# Patient Record
Sex: MALE | Race: WHITE
[De-identification: names, ages, dates, MRNs, and addresses within clinical notes are randomized per-mention and may not be internally consistent; named-entity substitution may affect disease eponyms.]

---

## 2018-03-28 ENCOUNTER — HOSPITAL ENCOUNTER (EMERGENCY)
Dept: HOSPITAL 89 - ER | Age: 23
Discharge: HOME | End: 2018-03-28
Payer: MEDICAID

## 2018-03-28 VITALS — DIASTOLIC BLOOD PRESSURE: 87 MMHG | SYSTOLIC BLOOD PRESSURE: 147 MMHG

## 2018-03-28 DIAGNOSIS — R07.1: Primary | ICD-10-CM

## 2018-03-28 DIAGNOSIS — I45.10: ICD-10-CM

## 2018-03-28 PROCEDURE — 93005 ELECTROCARDIOGRAM TRACING: CPT

## 2018-03-28 PROCEDURE — 71046 X-RAY EXAM CHEST 2 VIEWS: CPT

## 2018-03-28 PROCEDURE — 99283 EMERGENCY DEPT VISIT LOW MDM: CPT

## 2018-03-28 NOTE — ER REPORT
History and Physical


Time Seen By MD:  20:59


Hx. of Stated Complaint:  


PT REPORTS THAT HE DEVELOPED CHEST PAIN ABOUT ONE HOUR AGO.  PT REPORTS BEING 


SOB BUT STATES THAT THE SOB HAS RESOLVED.


HPI/ROS


CHIEF COMPLAINT: Cough, headache, chest pain





HISTORY OF PRESENT ILLNESS: 22-year-old male presents ambulatory to the ER with 

2 days of symptoms.  He's been having a sore throat and a cough.  Tonight.  

Patient notes some chest pain which concerned him.  He also notes a dull 

headache.  He denies photophobia or stiff neck.  Patient denies exposure to ill 

contacts.  Patient notes some nausea but no vomiting





REVIEW OF SYSTEMS:


Respiratory: As above


Cardiovascular: As above


Gastrointestinal: No vomiting, no abdominal pain.


Musculoskeletal: No back pain.


Allergies:  


Uncoded Allergies:  


     ANTS (Adverse Reaction, Intermediate, SWELLING, 16)


Home Meds


Discontinued Reported Medications


Acetaminophen (TYLENOL EXTRA STRENGTH) 500 Mg Tablet, 500 MG PO, TAB


   16


Reviewed Nurses Notes:  Yes


Old Medical Records Reviewed:  Yes


Hx Smoking:  No


Hx Substance Use Disorder:  No


Constitutional





Vital Sign - Last 24 Hours








 3/28/18 3/28/18 3/28/18 3/28/18





 20:44 20:58 21:00 21:15


 


Temp 99.0   


 


Pulse 109  108 98


 


Resp 14  20 15


 


B/P (MAP) 117/77 143/89 (107)  


 


Pulse Ox 95  95 95


 


O2 Delivery Room Air   


 


    





 3/28/18 3/28/18  





 21:28 21:30  


 


Pulse  86  


 


Resp  17  


 


B/P (MAP) 147/87 (107)   


 


Pulse Ox  94  








Physical Exam


  General Appearance: The patient is alert, has no immediate need for airway 

protection and no current signs of toxicity.  Vital signs stable, afebrile, 

pulse ox normal


HEENT: Pupils equal and round no injection.  TMs normal, oropharynx with mild 

redness, no exudate


Respiratory: Chest is non tender, lungs are clear to auscultation.  No wheezing 

or rails, positive.  Chest wall tenderness


Cardiac: regular rate and rhythm


Gastrointestinal: Abdomen is soft and non tender, no masses, bowel sounds 

normal.


Musculoskeletal:  Neck: Neck is supple and non tender.  No meningismus, no 

lymphadenopathy


Extremities have full range of motion and are non tender.


Skin: No rashes or lesions.





DIFFERENTIAL DIAGNOSIS: After history and physical exam differential diagnosis 

was considered for chest pain including but not limited to myocardial ischemia, 

pericarditis pulmonary embolus, chest wall pain, pleural inflammation and 

pulmonary infectious causes.





Medical Decision Making


EKG/Imaging


EKG Interpretation


12 lead EK


      Rhythm: normal sinus rhythm


      Axis: normal 


      QRS: normal, incomplete right bundle branch block pattern


      ST segments: normal, no evidence of ischemia or dysrhythmia


Imaging


X-ray: Two-view chest x-ray was obtained.  I viewed the images myself on the 

PACS system.  My interpretation of the images is: No infiltrate, no effusion, 

normal mediastinum.  The radiologist interpretation had no clinically 

significant variation from this interpretation.





ED Course/Re-evaluation


ED Course


Patient was admitted to an examination room.  H&P was done.  The differential 

diagnoses was considered.  On clinical examination.  Patient has chest wall 

tenderness.  His been having infectious symptoms.  He likely has viral 

syndrome.  He's had no high fever to suggest influenza.  Patient's throat has 

no exudate.  Patient's treated with Motrin, tramadol and Phenergan.  A chest x-

ray is unremarkable for infiltrate or pneumonia.  Patient's advised to 

conservative treatment plan.


Decision to Disposition Date:  Mar 28, 2018


Decision to Disposition Time:  21:40





Depart


Departure


Latest Vital Signs





Vital Signs








  Date Time  Temp Pulse Resp B/P (MAP) Pulse Ox O2 Delivery O2 Flow Rate FiO2


 


3/28/18 21:30  86 17  94   


 


3/28/18 21:28    147/87 (107)    


 


3/28/18 20:44 99.0     Room Air  








Impression:  


 Primary Impression:  


 Viral syndrome


 Additional Impression:  


 Chest pain


Condition:  Improved


Disposition:  HOME OR SELF-CARE


New Scripts


No Active Prescriptions or Reported Meds


Patient Instructions:  Chest Wall Pain (ED), Viral Syndrome (ED)





Additional Instructions:  


Take ibuprofen 200 mg 3 tablets 3 times a day with food for 3-5 days


Follow-up with your primary care if unimproved in 3-5 days





Problem Qualifiers








 Additional Impression:  


 Chest pain


 Chest pain type:  chest pain on breathing  Qualified Codes:  R07.1 - Chest 

pain on breathing








SETH MENDEZ DO Mar 28, 2018 21:01

## 2018-03-28 NOTE — RADIOLOGY IMAGING REPORT
FACILITY: [facility] 

 

PATIENT NAME: Andre Murphy

: 1995

MR: [id number]

V: [id number]

EXAM DATE: 

ORDERING PHYSICIAN: SETH MENDEZ

TECHNOLOGIST: 

 

Location: [facility] [place]

Patient: Andre Murphy

: 1995

MRN: [id number]

Visit/Account:[id number]

Date of Sevice:  3/28/2018

 

ACCESSION #: [id number]

 

2 VIEWS CHEST

 

INDICATION: Chest pain and dyspnea.

 

COMPARISON: None available

 

FINDINGS:

Cardiomediastinal silhouette and pulmonary vessels within normal limits.

There is no focal infiltrate or lobar consolidation.

There is no pneumothorax or pleural effusion.

No nodule.

Upper abdomen is unremarkable. No acute bony abnormality.

 

IMPRESSION:

1. No acute cardiopulmonary process.

 

Report Dictated By: Keith Bautista at 3/28/2018 9:43 PM

 

Report E-Signed By: Keith Bautista  at 3/28/2018 9:44 PM

 

WSN:[id number]

## 2018-03-28 NOTE — EKG
FACILITY: [facility] [place] 

 

PATIENT NAME: OVI SCHROEDER

: [id number]

MR: [id number]

V: [id number]

EXAM DATE: 

ORDERING PHYSICIAN: SETH MENDEZ

TECHNOLOGIST: SUGEY

 

Test Reason : CARDIAC

Blood Pressure : ***/*** mmHG

Vent. Rate : 099 BPM     Atrial Rate : 099 BPM

   P-R Int : 158 ms          QRS Dur : 094 ms

    QT Int : 350 ms       P-R-T Axes : 049 088 049 degrees

   QTc Int : 449 ms

 

Normal sinus rhythm

Incomplete right bundle branch block

Borderline ECG

No previous ECGs available

Confirmed by TIFF HANEY ([id number]) on 3/29/2018 2:18:10 PM

 

Referred By:             Confirmed By:TIFF HANEY